# Patient Record
Sex: MALE | Race: WHITE | Employment: FULL TIME | URBAN - METROPOLITAN AREA
[De-identification: names, ages, dates, MRNs, and addresses within clinical notes are randomized per-mention and may not be internally consistent; named-entity substitution may affect disease eponyms.]

---

## 2022-03-30 ENCOUNTER — HOSPITAL ENCOUNTER (EMERGENCY)
Age: 22
Discharge: HOME | End: 2022-03-30
Attending: STUDENT IN AN ORGANIZED HEALTH CARE EDUCATION/TRAINING PROGRAM

## 2022-03-30 ENCOUNTER — APPOINTMENT (OUTPATIENT)
Dept: CT IMAGING | Age: 22
End: 2022-03-30
Attending: PHYSICIAN ASSISTANT

## 2022-03-30 VITALS — TEMPERATURE: 97.8 F | OXYGEN SATURATION: 99 % | HEIGHT: 67 IN | WEIGHT: 135 LBS | BODY MASS INDEX: 21.19 KG/M2

## 2022-03-30 DIAGNOSIS — S02.2XXB OPEN FRACTURE OF NASAL BONE, INITIAL ENCOUNTER: Primary | ICD-10-CM

## 2022-03-30 DIAGNOSIS — S01.81XA FACIAL LACERATION, INITIAL ENCOUNTER: ICD-10-CM

## 2022-03-30 PROCEDURE — 70486 CT MAXILLOFACIAL W/O DYE: CPT

## 2022-03-30 PROCEDURE — 70450 CT HEAD/BRAIN W/O DYE: CPT

## 2022-03-30 PROCEDURE — 99284 EMERGENCY DEPT VISIT MOD MDM: CPT

## 2022-03-30 PROCEDURE — 75810000293 HC SIMP/SUPERF WND  RPR

## 2022-03-30 RX ORDER — AMOXICILLIN AND CLAVULANATE POTASSIUM 875; 125 MG/1; MG/1
1 TABLET, FILM COATED ORAL 2 TIMES DAILY
Qty: 20 TABLET | Refills: 0 | Status: SHIPPED | OUTPATIENT
Start: 2022-03-30

## 2022-03-30 RX ORDER — HYDROCODONE BITARTRATE AND ACETAMINOPHEN 5; 325 MG/1; MG/1
1 TABLET ORAL
Qty: 10 TABLET | Refills: 0 | Status: SHIPPED | OUTPATIENT
Start: 2022-03-30 | End: 2022-04-02

## 2022-03-30 NOTE — ED PROVIDER NOTES
EMERGENCY DEPARTMENT HISTORY AND PHYSICAL EXAM    Date: 3/30/2022  Patient Name: Christa Solis    History of Presenting Illness     Chief Complaint   Patient presents with    Nasal Pain         History Provided By: Patient    Chief Complaint: Possible nasal bridge fracture  Duration: Prior to arrival  Timing:    Location: Nasal bridge  Quality: Aching, pulsating headache  Severity: Moderate  Modifying Factors:   Associated Symptoms: none       Additional History (Context): Christa Solis is a 22 y.o. male with no pertinent medical history presents for evaluation of possible fracture to his nasal bridge after injury that occurred at work today. 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 States he did not have any loss of consciousness, but was dazed initially and had some difficulty walking. No vomiting, no vision changes. Patient went to [facility] urgent care, had x-rays and was given his tetanus shot. Provider was concerned of a soft free air in the images. Recommended evaluation at the emergency department. Does have a small laceration/abrasion to the bridge of the nose. At this time no other symptoms other than pulsating headache from getting hit in the head. PCP: None        Past History     Past Medical History:  No past medical history on file. Past Surgical History:  No past surgical history on file. Family History:  No family history on file. Social History:  Social History     Tobacco Use    Smoking status: Not on file    Smokeless tobacco: Not on file   Substance Use Topics    Alcohol use: Not on file    Drug use: Not on file       Allergies:  No Known Allergies      Review of Systems   Review of Systems   Constitutional: Negative for chills, fatigue and fever. HENT: Positive for facial swelling and sinus pain. Negative for congestion, drooling, ear pain and trouble swallowing. Eyes: Negative for pain, discharge, redness and itching. Gastrointestinal: Negative for nausea and vomiting. Musculoskeletal: Negative for arthralgias, back pain and gait problem. Skin: Positive for wound. Neurological: Positive for headaches. Negative for dizziness, syncope, weakness and light-headedness. All Other Systems Negative  Physical Exam     Vitals:    03/30/22 1219   Temp: 97.8 °F (36.6 °C)   SpO2: 99%   Weight: 61.2 kg (135 lb)   Height: 5' 7\" (1.702 m)     Physical Exam  Constitutional:       General: He is not in acute distress. Appearance: Normal appearance. He is normal weight. He is not ill-appearing or toxic-appearing. HENT:      Head: Normocephalic. Nose: Nasal deformity, signs of injury, laceration and nasal tenderness present. No septal deviation, mucosal edema, congestion or rhinorrhea. Right Nostril: No foreign body, epistaxis, septal hematoma or occlusion. Left Nostril: No foreign body, epistaxis, septal hematoma or occlusion. Right Turbinates: Not enlarged, swollen or pale. Left Turbinates: Not enlarged, swollen or pale. Right Sinus: No maxillary sinus tenderness or frontal sinus tenderness. Left Sinus: No maxillary sinus tenderness or frontal sinus tenderness. Comments: No septal hematoma  Bruising and small abrasion to the nasal bridge     Mouth/Throat:      Mouth: Mucous membranes are moist.      Pharynx: Oropharynx is clear. Eyes:      Extraocular Movements: Extraocular movements intact. Conjunctiva/sclera: Conjunctivae normal.      Pupils: Pupils are equal, round, and reactive to light. Comments: No entrapment of the extraocular muscles  Pain around the orbits with palpation, no swelling or raccoon eyes   Cardiovascular:      Rate and Rhythm: Normal rate and regular rhythm. Pulses: Normal pulses. Heart sounds: Normal heart sounds. Pulmonary:      Effort: Pulmonary effort is normal.      Breath sounds: Normal breath sounds.    Musculoskeletal:         General: Normal range of motion. Cervical back: Normal range of motion. Skin:     General: Skin is warm and dry. Capillary Refill: Capillary refill takes less than 2 seconds. Neurological:      General: No focal deficit present. Mental Status: He is alert and oriented to person, place, and time. Psychiatric:         Mood and Affect: Mood normal.         Behavior: Behavior normal.         Thought Content: Thought content normal.           Diagnostic Study Results     Labs -   No results found for this or any previous visit (from the past 12 hour(s)). Radiologic Studies -   CT MAXILLOFACIAL WO CONT   Final Result      Mildly comminuted and displaced nasal bone fracture as described above.   -Associated minimally displaced fracture of the anterior septum with mild   rightward deviation   -Overlying soft tissue laceration. CT HEAD WO CONT   Final Result   No acute intracranial abnormality. CT Results  (Last 48 hours)               03/30/22 1415  CT MAXILLOFACIAL WO CONT Final result    Impression:      Mildly comminuted and displaced nasal bone fracture as described above.   -Associated minimally displaced fracture of the anterior septum with mild   rightward deviation   -Overlying soft tissue laceration. Narrative:  EXAM: CT FACE W/O CONTRAST       INDICATION: hit in nasal bridge with metal bar, abnormality on x ray from urgent   care; hit in nasal bridge with metal bar, abnormality on x ray from urgent care. Hit nasal bridge on a metal bar. Pulsating headache. Dizziness. Laceration on   bridge of nose. Abnormality on outside facility x-ray, not available for   comparison. COMPARISON: None       TECHNIQUE: Multiple axial CT images of the maxillofacial structures including   the mandible were performed. Additional coronal and sagittal reformations were   also performed.  One or more dose reduction techniques were used on this CT:   automated exposure control, adjustment of the mAs and/or kVp according to   patient's size, and iterative reconstruction techniques. The specific techniques   utilized on this CT exam have been documented in the patient's electronic   medical record. Digital Imaging and Communications in Medicine (DICOM) format   image data are available to nonaffiliated external healthcare facilities or   entities on a secure, media free, reciprocally searchable basis with patient   authorization for at least a 12-month period after this study. _______________       FINDINGS:       OSSEOUS STRUCTURES: Comminuted fracture of the nasal bone, with depression of   the superior left nasal bone and mild lateral displacement of the right lateral   nasal bone. Probable minimally displaced fracture anterior septum (series 601,   image [id number]), with slight rightward deviation. The mandible is intact. No   dislocation is seen involving the temporomandibular joint. GLOBES AND ORBITS:  Ocular globes are intact. PARANASAL SINUSES: Clear       VISUALIZED MASTOID AIR CELLS:  Clear       VISIBLE INTRACRANIAL CONTENTS:  Unremarkable       OTHER: Laceration overlying the previously described nasal bone fractures. _______________           03/30/22 1415  CT HEAD WO CONT Final result    Impression:  No acute intracranial abnormality. Narrative:  CT of the head without contrast       HISTORY: Hit nasal bridge on a metal bar. Pulsating headache. Dizziness. Laceration on bridge of nose. Abnormality on outside facility x-ray, not   available for comparison. COMPARISON: None       TECHNIQUE: Axial images of the brain were obtained, without the administration   of intravenous contrast. Coronal and sagittal reconstructions were generated.        All CT scans at this facility are performed using dose optimization technique as   appropriate to a performed exam, to include automated exposure control,   adjustment of the MA and/or kV according to patient size (including appropriate   matching for site-specific examinations) or use of  iterative reconstruction   technique. FINDINGS:        Brain: Normal.       Paranasal sinuses and mastoid air cells: Paranasal sinuses are clear. Mastoid   air cells are clear. Calvarium: No acute fracture. Please see dedicated CT face for complete   evaluation. CXR Results  (Last 48 hours)    None            Medical Decision Making   I am the first provider for this patient. I reviewed the vital signs, available nursing notes, past medical history, past surgical history, family history and social history. Vital Signs-Reviewed the patient's vital signs. Records Reviewed: Nursing Notes and Old Medical Records     Procedures: None   Wound Repair    Date/Time: 3/30/2022 3:58 PM  Preparation: skin prepped with alcohol  Pre-procedure re-eval: Immediately prior to the procedure, the patient was reevaluated and found suitable for the planned procedure and any planned medications. Location details: face  Wound length:2.5 cm or less    Anesthesia:  Anesthetic total: 0 mL  Foreign bodies: no foreign bodies  Irrigation solution: saline  Patient tolerance: patient tolerated the procedure well with no immediate complications  My total time at bedside, performing this procedure was 1-15 minutes. Provider Notes (Medical Decision Making):     CT shows fracture, minimally displaced to the right, of the nasal bone. Does have a small laceration to the bridge of the nose. Explored the wound, but it has started to heal and appears superficial. Applied dermabond to the wound, discussed wound care with pt and symptoms to watch for.     1525: Discussed case with Dr. Clarke Benoit, ENT. Recommends follow up in his office in 5-7 days, let the swelling go down and determine if needs revision. Cover with abx. Discussed with pt, answered all questions.  Was given TD vaccine at the Urgent Care. Pain medication prn. Strict return precautions given. MED RECONCILIATION:  No current facility-administered medications for this encounter. Current Outpatient Medications   Medication Sig    amoxicillin-clavulanate (Augmentin) 875-125 mg per tablet Take 1 Tablet by mouth two (2) times a day.  HYDROcodone-acetaminophen (Norco) 5-325 mg per tablet Take 1 Tablet by mouth every six (6) hours as needed for Pain for up to 3 days. Max Daily Amount: 4 Tablets. Disposition:  Home     DISCHARGE NOTE:   Pt has been reexamined. Patient has no new complaints, changes, or physical findings. Care plan outlined and precautions discussed. Results of workup were reviewed with the patient. All medications were reviewed with the patient. All of pt's questions and concerns were addressed. Patient was instructed and agrees to follow up with PCP as well as to return to the ED upon further deterioration. Patient is ready to go home. Follow-up Information     Follow up With Specialties Details Why Contact Info    Claire Martínez MD Otolaryngology, Surgery Schedule an appointment as soon as possible for a visit   [address] [place] [address]  [phone]            Current Discharge Medication List      START taking these medications    Details   amoxicillin-clavulanate (Augmentin) 875-125 mg per tablet Take 1 Tablet by mouth two (2) times a day. Qty: 20 Tablet, Refills: 0  Start date: 3/30/2022      HYDROcodone-acetaminophen (Norco) 5-325 mg per tablet Take 1 Tablet by mouth every six (6) hours as needed for Pain for up to 3 days. Max Daily Amount: 4 Tablets. Qty: 10 Tablet, Refills: 0  Start date: 3/30/2022, End date: 4/2/2022    Associated Diagnoses: Open fracture of nasal bone, initial encounter                 Diagnosis     Clinical Impression:   1. Open fracture of nasal bone, initial encounter    2.  Facial laceration, initial encounter          \"Please note that this dictation was completed with Dragon, the computer voice recognition software. Quite often unanticipated grammatical, syntax, homophones, and other interpretive errors are inadvertently transcribed by the computer software. Please disregard these errors. Please excuse any errors that have escaped final proofreading. \"

## 2022-03-30 NOTE — DISCHARGE INSTRUCTIONS
Follow up with Dr. Mac Leija   Call his office and let them know the provider at the Emergency Department spoke with Dr. Mac Leija and recommended you be seen in 5-7 days for fracture  Take all of the antibiotics as directed until gone  Return to the ER if you develop worsening symptoms such as fever, increased pain, or redness

## 2022-03-30 NOTE — ED TRIAGE NOTES
Pt sent to ed by [facility] for possible broken nose. Pt reports was hit in face by compacter handle while moving it off truck. Denies LOC had some dizziness afterwards. Bleeding control, laceration noted to bridge of nose. No past medical history on file. No past surgical history on file.

## 2022-03-30 NOTE — Clinical Note
[address] [facility] EMERGENCY DEPT  [id number] [id number] [phone]    Work/School Note    Date: 3/30/2022    To Whom It May concern: Tita Gallegos was seen and treated today in the emergency room by the following provider(s):  Attending Provider: Lydia Young MD  Physician Assistant: Jose R Davis PA-C. Tita Gallegos is excused from work/school on 3/30/2022 through 4/1/2022. He is medically clear to return to work/school on 4/2/2022.          Sincerely,          Ish Joyce PA-C